# Patient Record
Sex: FEMALE | Race: WHITE | NOT HISPANIC OR LATINO | URBAN - NONMETROPOLITAN AREA
[De-identification: names, ages, dates, MRNs, and addresses within clinical notes are randomized per-mention and may not be internally consistent; named-entity substitution may affect disease eponyms.]

---

## 2023-12-21 ENCOUNTER — LAB OUTSIDE AN ENCOUNTER (OUTPATIENT)
Dept: URBAN - NONMETROPOLITAN AREA CLINIC 2 | Facility: CLINIC | Age: 78
End: 2023-12-21

## 2023-12-21 ENCOUNTER — OFFICE VISIT (OUTPATIENT)
Dept: URBAN - NONMETROPOLITAN AREA CLINIC 2 | Facility: CLINIC | Age: 78
End: 2023-12-21
Payer: MEDICARE

## 2023-12-21 VITALS
WEIGHT: 152 LBS | SYSTOLIC BLOOD PRESSURE: 137 MMHG | HEIGHT: 64 IN | TEMPERATURE: 98 F | HEART RATE: 83 BPM | DIASTOLIC BLOOD PRESSURE: 80 MMHG | BODY MASS INDEX: 25.95 KG/M2

## 2023-12-21 DIAGNOSIS — K92.1 MELENA: ICD-10-CM

## 2023-12-21 DIAGNOSIS — Z12.11 COLON CANCER SCREENING: ICD-10-CM

## 2023-12-21 DIAGNOSIS — D50.9 IRON DEFICIENCY ANEMIA, UNSPECIFIED IRON DEFICIENCY ANEMIA TYPE: ICD-10-CM

## 2023-12-21 PROBLEM — 87522002: Status: ACTIVE | Noted: 2023-12-21

## 2023-12-21 PROCEDURE — 99204 OFFICE O/P NEW MOD 45 MIN: CPT | Performed by: NURSE PRACTITIONER

## 2023-12-21 NOTE — HPI-TODAY'S VISIT:
Rekha Bowling is an 78-year-old female who presents with YOGESH and melena.  We have been asked to consult on by Dr. Kiley Cooper.  A copy of this note along with recommendations will be sent to referring provider.  She has never had a prior endoscopy or colonoscopy.  However, she was routinely taking Advil for her joints.  She started having several days worth of melena and stopped this as well as stopped the apple cider vinegar that she would occasionally drink for muscle cramps.  She was initiated on iron, but did not want to take it due to rough nature of her stomach, so she switched over to an different type of iron that her daughter takes that has been fairly gentle on her GI tract.  Her hemoglobin is currently 10.3.  Previously, it was normal.  Her iron count is 15.  She is currently off of NSAIDs and without any GI complaints.  She is a retired nurse.  She does verbalize that she is aware that YOGESH can be a sign of colon cancer, but does decline colonoscopy.  She also requests a female provider. Sb

## 2024-02-22 ENCOUNTER — EGD (OUTPATIENT)
Dept: URBAN - NONMETROPOLITAN AREA SURGERY CENTER 1 | Facility: SURGERY CENTER | Age: 79
End: 2024-02-22

## 2024-02-22 ENCOUNTER — LAB (OUTPATIENT)
Dept: URBAN - METROPOLITAN AREA CLINIC 4 | Facility: CLINIC | Age: 79
End: 2024-02-22
Payer: MEDICARE

## 2024-02-22 DIAGNOSIS — K31.89 OTHER DISEASES OF STOMACH AND DUODENUM: ICD-10-CM

## 2024-02-22 DIAGNOSIS — K29.70 GASTRITIS, UNSPECIFIED, WITHOUT BLEEDING: ICD-10-CM

## 2024-02-22 PROCEDURE — 88305 TISSUE EXAM BY PATHOLOGIST: CPT | Performed by: PATHOLOGY

## 2024-02-22 PROCEDURE — 88313 SPECIAL STAINS GROUP 2: CPT | Performed by: PATHOLOGY

## 2024-02-22 PROCEDURE — 88312 SPECIAL STAINS GROUP 1: CPT | Performed by: PATHOLOGY

## 2024-04-01 ENCOUNTER — OV EP (OUTPATIENT)
Dept: URBAN - NONMETROPOLITAN AREA CLINIC 13 | Facility: CLINIC | Age: 79
End: 2024-04-01
Payer: MEDICARE

## 2024-04-01 VITALS
SYSTOLIC BLOOD PRESSURE: 143 MMHG | DIASTOLIC BLOOD PRESSURE: 75 MMHG | WEIGHT: 152 LBS | HEART RATE: 73 BPM | TEMPERATURE: 98 F | BODY MASS INDEX: 25.95 KG/M2 | HEIGHT: 64 IN

## 2024-04-01 DIAGNOSIS — K92.1 MELENA: ICD-10-CM

## 2024-04-01 DIAGNOSIS — Z12.11 COLON CANCER SCREENING: ICD-10-CM

## 2024-04-01 DIAGNOSIS — D50.8 OTHER IRON DEFICIENCY ANEMIA: ICD-10-CM

## 2024-04-01 PROCEDURE — 99214 OFFICE O/P EST MOD 30 MIN: CPT | Performed by: NURSE PRACTITIONER

## 2024-04-01 NOTE — HPI-TODAY'S VISIT:
inital consult: Rekha Bowling is an 78-year-old female who presents with YOGESH and melena.  We have been asked to consult on by Dr. Kiley Cooper.  A copy of this note along with recommendations will be sent to referring provider.  She has never had a prior endoscopy or colonoscopy.  However, she was routinely taking Advil for her joints.  She started having several days worth of melena and stopped this as well as stopped the apple cider vinegar that she would occasionally drink for muscle cramps.  She was initiated on iron, but did not want to take it due to rough nature of her stomach, so she switched over to an different type of iron that her daughter takes that has been fairly gentle on her GI tract.  Her hemoglobin is currently 10.3.  Previously, it was normal.  Her iron count is 15.  She is currently off of NSAIDs and without any GI complaints.  She is a retired nurse.  She does verbalize that she is aware that YOGESH can be a sign of colon cancer, but does decline colonoscopy.  She also requests a female provider. Sb  4/1/24 Rekha Bowling returns for f/u of melena. no further melena. Is using NSAIDs, but sparingly. no GI concerns today.   2/2024 egd with 2 cm hh

## 2024-04-08 ENCOUNTER — OV EP (OUTPATIENT)
Dept: URBAN - NONMETROPOLITAN AREA CLINIC 13 | Facility: CLINIC | Age: 79
End: 2024-04-08